# Patient Record
Sex: MALE | Race: BLACK OR AFRICAN AMERICAN | NOT HISPANIC OR LATINO | Employment: UNEMPLOYED | URBAN - METROPOLITAN AREA
[De-identification: names, ages, dates, MRNs, and addresses within clinical notes are randomized per-mention and may not be internally consistent; named-entity substitution may affect disease eponyms.]

---

## 2023-12-28 ENCOUNTER — TELEPHONE (OUTPATIENT)
Dept: PULMONOLOGY | Facility: CLINIC | Age: 58
End: 2023-12-28
Payer: MEDICAID

## 2023-12-28 DIAGNOSIS — R06.02 SOB (SHORTNESS OF BREATH): Primary | ICD-10-CM

## 2023-12-28 NOTE — TELEPHONE ENCOUNTER
----- Message from Aarti Logan sent at 12/28/2023  8:47 AM CST -----  Regarding: STAT REFERRAL  Gene,    Provider Keyur Maddox would like to refer the patient to the Pulmonology department.      The patients diagnosis is: dyspnea    I have scanned the patients referral and records into .     Please review and contact patient to schedule appointment. If there are no appointments available at this time, please advise and I will inform the referring provider/clinic      Thank you,   Aarti Kelley

## 2024-01-12 ENCOUNTER — OFFICE VISIT (OUTPATIENT)
Dept: PULMONOLOGY | Facility: CLINIC | Age: 59
End: 2024-01-12
Payer: MEDICAID

## 2024-01-12 ENCOUNTER — HOSPITAL ENCOUNTER (OUTPATIENT)
Dept: PULMONOLOGY | Facility: CLINIC | Age: 59
Discharge: HOME | End: 2024-01-12
Payer: MEDICAID

## 2024-01-12 VITALS
HEIGHT: 71 IN | DIASTOLIC BLOOD PRESSURE: 91 MMHG | BODY MASS INDEX: 29.73 KG/M2 | SYSTOLIC BLOOD PRESSURE: 144 MMHG | HEART RATE: 70 BPM | OXYGEN SATURATION: 98 %

## 2024-01-12 VITALS — WEIGHT: 213.19 LBS | HEIGHT: 71 IN | BODY MASS INDEX: 29.85 KG/M2

## 2024-01-12 DIAGNOSIS — R06.02 SOB (SHORTNESS OF BREATH): Primary | ICD-10-CM

## 2024-01-12 DIAGNOSIS — R06.02 SOB (SHORTNESS OF BREATH): ICD-10-CM

## 2024-01-12 DIAGNOSIS — R04.2 HEMOPTYSIS: ICD-10-CM

## 2024-01-12 LAB
DLCO SINGLE BREATH LLN: 23.07
DLCO SINGLE BREATH PRE REF: 63.9 %
DLCO SINGLE BREATH REF: 30
DLCOC SBVA LLN: 2.95
DLCOC SBVA REF: 4.09
DLCOC SINGLE BREATH LLN: 23.07
DLCOC SINGLE BREATH REF: 30
DLCOCSBVAULN: 5.24
DLCOCSINGLEBREATHULN: 36.92
DLCOSINGLEBREATHULN: 36.92
DLCOVA LLN: 2.95
DLCOVA PRE REF: 94.5 %
DLCOVA PRE: 3.87 ML/(MIN*MMHG*L) (ref 2.95–5.24)
DLCOVA REF: 4.09
DLCOVAULN: 5.24
ERV LLN: -16448.78
ERV PRE REF: 47.3 %
ERV REF: 1.22
ERVULN: ABNORMAL
FEF 25 75 LLN: 1.12
FEF 25 75 PRE REF: 119 %
FEF 25 75 REF: 2.66
FEV05 LLN: 1.77
FEV05 REF: 2.91
FEV1 FVC LLN: 66
FEV1 FVC PRE REF: 101.4 %
FEV1 FVC REF: 78
FEV1 LLN: 2.29
FEV1 PRE REF: 84.3 %
FEV1 REF: 3.16
FRCPLETH LLN: 2.68
FRCPLETH PREREF: 64 %
FRCPLETH REF: 3.67
FRCPLETHULN: 4.66
FVC LLN: 3.02
FVC PRE REF: 83 %
FVC REF: 4.06
IVC PRE: 3.62 L (ref 3.02–5.11)
IVC SINGLE BREATH LLN: 3.02
IVC SINGLE BREATH PRE REF: 89.2 %
IVC SINGLE BREATH REF: 4.06
IVCSINGLEBREATHULN: 5.11
LLN IC: -9999996.55
PEF LLN: 5.94
PEF PRE REF: 91.9 %
PEF REF: 8.67
PHYSICIAN COMMENT: ABNORMAL
PRE DLCO: 19.17 ML/(MIN*MMHG) (ref 23.07–36.92)
PRE ERV: 0.58 L (ref -16448.78–16451.22)
PRE FEF 25 75: 3.17 L/S (ref 1.12–4.87)
PRE FET 100: 7.26 SEC
PRE FEV05 REF: 78.6 %
PRE FEV1 FVC: 79.04 % (ref 66.33–88.05)
PRE FEV1: 2.66 L (ref 2.29–3.97)
PRE FEV5: 2.28 L (ref 1.77–4.04)
PRE FRC PL: 2.35 L (ref 2.68–4.66)
PRE FVC: 3.37 L (ref 3.02–5.11)
PRE IC: 3.04 L (ref -9999996.55–#######.####)
PRE PEF: 7.96 L/S (ref 5.94–11.4)
PRE REF IC: 88.1 %
PRE RV: 1.77 L (ref 1.78–3.13)
PRE TLC: 5.39 L (ref 6.18–8.48)
RAW PRE REF: 144.2 %
RAW PRE: 4.41 CMH2O*S/L (ref 3.06–3.06)
RAW REF: 3.06
REF IC: 3.45
RV LLN: 1.78
RV PRE REF: 72.3 %
RV REF: 2.45
RVTLC LLN: 28
RVTLC PRE REF: 88 %
RVTLC PRE: 32.89 % (ref 28.38–46.34)
RVTLC REF: 37
RVTLCULN: 46
RVULN: 3.13
SGAW PRE REF: 97 %
SGAW PRE: 0.08 1/(CMH2O*S) (ref 0.08–0.08)
SGAW REF: 0.08
TLC LLN: 6.18
TLC PRE REF: 73.6 %
TLC REF: 7.33
TLC ULN: 8.48
ULN IC: ABNORMAL
VA PRE: 4.96 L (ref 7.18–7.18)
VA SINGLE BREATH LLN: 7.18
VA SINGLE BREATH PRE REF: 69 %
VA SINGLE BREATH REF: 7.18
VASINGLEBREATHULN: 7.18
VC LLN: 3.02
VC PRE REF: 89.2 %
VC PRE: 3.62 L (ref 3.02–5.11)
VC REF: 4.06
VC ULN: 5.11

## 2024-01-12 PROCEDURE — 94729 DIFFUSING CAPACITY: CPT | Mod: 26,S$PBB,, | Performed by: INTERNAL MEDICINE

## 2024-01-12 PROCEDURE — 99213 OFFICE O/P EST LOW 20 MIN: CPT | Mod: PBBFAC | Performed by: STUDENT IN AN ORGANIZED HEALTH CARE EDUCATION/TRAINING PROGRAM

## 2024-01-12 PROCEDURE — 94726 PLETHYSMOGRAPHY LUNG VOLUMES: CPT | Mod: PBBFAC | Performed by: INTERNAL MEDICINE

## 2024-01-12 PROCEDURE — 94010 BREATHING CAPACITY TEST: CPT | Mod: PBBFAC | Performed by: INTERNAL MEDICINE

## 2024-01-12 PROCEDURE — 1159F MED LIST DOCD IN RCRD: CPT | Mod: CPTII,,, | Performed by: INTERNAL MEDICINE

## 2024-01-12 PROCEDURE — 94726 PLETHYSMOGRAPHY LUNG VOLUMES: CPT | Mod: 26,S$PBB,, | Performed by: INTERNAL MEDICINE

## 2024-01-12 PROCEDURE — 94618 PULMONARY STRESS TESTING: CPT | Mod: 26,S$PBB,, | Performed by: INTERNAL MEDICINE

## 2024-01-12 PROCEDURE — 3008F BODY MASS INDEX DOCD: CPT | Mod: CPTII,,, | Performed by: INTERNAL MEDICINE

## 2024-01-12 PROCEDURE — 94729 DIFFUSING CAPACITY: CPT | Mod: PBBFAC | Performed by: INTERNAL MEDICINE

## 2024-01-12 PROCEDURE — 99204 OFFICE O/P NEW MOD 45 MIN: CPT | Mod: S$PBB,,, | Performed by: INTERNAL MEDICINE

## 2024-01-12 PROCEDURE — 94618 PULMONARY STRESS TESTING: CPT | Mod: PBBFAC | Performed by: INTERNAL MEDICINE

## 2024-01-12 PROCEDURE — 99999 PR PBB SHADOW E&M-EST. PATIENT-LVL III: CPT | Mod: PBBFAC,,, | Performed by: STUDENT IN AN ORGANIZED HEALTH CARE EDUCATION/TRAINING PROGRAM

## 2024-01-12 PROCEDURE — 3077F SYST BP >= 140 MM HG: CPT | Mod: CPTII,,, | Performed by: INTERNAL MEDICINE

## 2024-01-12 PROCEDURE — 94010 BREATHING CAPACITY TEST: CPT | Mod: 26,S$PBB,59, | Performed by: INTERNAL MEDICINE

## 2024-01-12 PROCEDURE — 3080F DIAST BP >= 90 MM HG: CPT | Mod: CPTII,,, | Performed by: INTERNAL MEDICINE

## 2024-01-12 RX ORDER — POLYMYXIN B SULFATE AND TRIMETHOPRIM 1; 10000 MG/ML; [USP'U]/ML
1 SOLUTION OPHTHALMIC EVERY 4 HOURS
COMMUNITY
Start: 2023-08-04

## 2024-01-12 RX ORDER — IBUPROFEN 800 MG/1
800 TABLET ORAL EVERY 8 HOURS PRN
COMMUNITY
Start: 2023-10-27

## 2024-01-12 RX ORDER — PANTOPRAZOLE SODIUM 40 MG/1
1 TABLET, DELAYED RELEASE ORAL 2 TIMES DAILY
COMMUNITY
Start: 2023-09-15 | End: 2024-09-14

## 2024-01-12 RX ORDER — CLINDAMYCIN HYDROCHLORIDE 150 MG/1
150 CAPSULE ORAL EVERY 6 HOURS
COMMUNITY
Start: 2023-10-27

## 2024-01-12 RX ORDER — ALBUTEROL SULFATE 90 UG/1
2 AEROSOL, METERED RESPIRATORY (INHALATION) EVERY 4 HOURS PRN
COMMUNITY
Start: 2023-08-29 | End: 2024-01-12 | Stop reason: SDUPTHER

## 2024-01-12 RX ORDER — TADALAFIL 20 MG/1
20 TABLET, FILM COATED ORAL
COMMUNITY
Start: 2023-08-16

## 2024-01-12 RX ORDER — PROMETHAZINE HYDROCHLORIDE AND DEXTROMETHORPHAN HYDROBROMIDE 6.25; 15 MG/5ML; MG/5ML
5 SYRUP ORAL 4 TIMES DAILY PRN
COMMUNITY
Start: 2023-08-04

## 2024-01-12 RX ORDER — BUDESONIDE 0.5 MG/2ML
INHALANT ORAL
COMMUNITY
Start: 2023-08-31 | End: 2024-01-12 | Stop reason: SDUPTHER

## 2024-01-12 RX ORDER — CEFDINIR 300 MG/1
300 CAPSULE ORAL
COMMUNITY

## 2024-01-12 RX ORDER — OMEPRAZOLE 40 MG/1
CAPSULE, DELAYED RELEASE ORAL
COMMUNITY
Start: 2023-08-31

## 2024-01-12 RX ORDER — ALBUTEROL SULFATE 90 UG/1
2 AEROSOL, METERED RESPIRATORY (INHALATION) EVERY 4 HOURS PRN
Qty: 18 G | Refills: 12 | Status: SHIPPED | OUTPATIENT
Start: 2024-01-12 | End: 2025-05-19

## 2024-01-12 RX ORDER — BUDESONIDE AND FORMOTEROL FUMARATE DIHYDRATE 80; 4.5 UG/1; UG/1
2 AEROSOL RESPIRATORY (INHALATION) 2 TIMES DAILY
COMMUNITY
Start: 2024-01-04 | End: 2024-01-12 | Stop reason: SDUPTHER

## 2024-01-12 RX ORDER — BUDESONIDE AND FORMOTEROL FUMARATE DIHYDRATE 80; 4.5 UG/1; UG/1
2 AEROSOL RESPIRATORY (INHALATION) 2 TIMES DAILY
Qty: 6.9 G | Refills: 12 | Status: SHIPPED | OUTPATIENT
Start: 2024-01-12 | End: 2025-01-11

## 2024-01-12 RX ORDER — PREDNISONE 10 MG/1
10 TABLET ORAL 2 TIMES DAILY
COMMUNITY
Start: 2023-08-08

## 2024-01-12 RX ORDER — SODIUM, POTASSIUM,MAG SULFATES 17.5-3.13G
SOLUTION, RECONSTITUTED, ORAL ORAL
COMMUNITY
Start: 2023-11-15

## 2024-01-12 NOTE — PROGRESS NOTES
[facility]    SUBJECTIVE:     Chief Complaint: Cough    History of Present Illness:  Chago Allan is a 58 y.o. male who presents for initial evaluation of cough. The patient has not been see n in this clinic but has seen an outside pulmonologist about 2 weeks prior to presentation. The patient reports that he had no previous pulmonary problems until 8/1/2023 when he was exposed to a significant amount of self-leveling concrete powder. He reported he was very ill for about a week following and presented to the ED where he was treated. 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 He feels that since then he has been having worsening RUBIN and has consistent wheezing and cough. Cough is sometimes productive of sputum that has a faint pink tinge to it. He also reports that he has had intermittent dark red bowel movements. He's followed up with GI and undergone an endoscopy but was told there were no abnormalities.    He was evaluated by pulmonology at [facility], PFTs were notable for a mild restriction and decrease in DLCO. He was started on symbicort and albuterol for possible occupational asthma. Since starting his inhalers his cough and wheezing have significantly improved, however he still reports signifciant RUBIN. He was previously able to walk several miles without having stop to catch his breath, however now can not walk 1 mile which is different for him. The patient does have reflux, but it has improved with omeprozole. No seasonal allergies.     Smoking: Smoked 20 years, 1/4 pack per day. Quit two years ago.  Other substances: None  Inhalers: None  Travel: None  Incarceration/homelessness: 18 months in snf 7-8 years ago.  Occupational/environmental exposures: Worked with sheet rock, always wore masks  Pets/hobbies: None  Recent illness: None  B-Symptoms: Weight loss, night sweats.   Autoimmune symptoms/features:  "None  Family Hx: Denies      Review of patient's allergies indicates:  Not on File    No past medical history on file.  No past surgical history on file.  No family history on file.  Social History     Socioeconomic History    Marital status: Unknown       Review of Systems:  ROS  CV: no syncope  ENT: no sore throat  Resp: per hpi  Eyes: no eye pain  Gastrointestinal: no nausea or vomiting  Integument/Breast: no rash  Musculoskeletal: no arthralgias  Neurological: no headaches  Behavioral/Psych: no confusion or depression  Heme: no bleeding      OBJECTIVE:     Vital Signs  Vitals:    01/12/24 1311   BP: (!) 144/91   BP Location: Right arm   Patient Position: Sitting   BP Method: Medium (Manual)   Pulse: 70   SpO2: 98%   Height: 5' 11" (1.803 m)     Body mass index is 29.73 kg/m².    Physical Exam:  Physical Exam  General: no distress  Eyes:  conjunctivae/corneas clear  Nose: no discharge  Neck: trachea midline with no masses appreciated  Lungs:  normal respiratory effort, no wheezes, no rales  Heart: regular rate and rhythm and no murmur  Abdomen: non-distended  Extremities: no cyanosis, no edema, no clubbing  Skin: No rashes or lesions. good skin turgor  Neurologic: alert, oriented, thought content appropriate    Laboratory:  CBC  No results found for: "WBC", "HGB", "HCT", "PLT", "MCV", "RDW"  BMP  No results found for: "NA", "K", "CL", "CO2", "BUN", "CREATININE", "GLU", "CALCIUM", "MG", "PHOS"  LFTs  No results found for: "PROT", "ALBUMIN", "BILITOT", "AST", "ALKPHOS", "ALT", "GGT"    Outside PFTs. 1/4/2023   Spirometry is within normal limits. No obstruction.   Lung volumes reveal moderate restriction (TLC 60-69% predicted).   Diffusion capacity is mildly reduced (60-79% predicted).     PFT  1/12/24                  FEV1/FVC           79  FEV1                    2.66 (84%)  FVC                      3.37 (83%)  RV                        1.77 (72.3%)  TLC                      73.6%  DLCO                   " "63.9%  DLCO/Va            3.87 (94.5%)     My interpretation (most recent):   No obstruction on spirometry. Mild restriction on lung volumes, without decrease in FVC. Mild decrease in DLCO, uncorrected for hemoglobin.    Chest Imaging, My Impression:   CT Chest 3/2018 (outside hospital report)  "Few small cysts are seen within the right lower lobe, in the paravertebral region and more posteriorly. The more posterior area, may be related to focal bronchiectasis, given the suggestion of branching. There are areas of dependent atelectasis, however, no significantly concerning nodules, parenchymal areas of lung consolidation or pleural effusions. No definite lymphadenopathy is identified on these noncontrast images."        ASSESSMENT/PLAN:     No problems updated.  Problem List Items Addressed This Visit          Pulmonary    SOB (shortness of breath) - Primary    Current Assessment & Plan     Patient coming in for initial evaluation of shortness of breath and wheezing. The patient reports an exposure inhaling concrete dust in 8/2023 which precipitated all symptoms. It seems unlikely that single exposure would lead to lasting symptoms given he has avoided all exposures since. It's possible he has had underlying pulmonary problems that have only just come to a head. PFTs with some mild restriction and decrease in DLCO. This does raise the question of ILD, only CT is an outside read with reported cysts vs. Bronchiectasis in 2018.    He was started on symbicort due to possible occupational asthma, patient did have significant wheezing and cough, and this has improved with bronchodilator / ICS therapy.    - Will check high-res CT chest to evaluate lung parenchyma, also will provide screening due to prior smoking history  - Continue symbicort as it is providing symptomatic relief  - Further workup pending CT  - Follow up in clinic in 1 month.         Relevant Medications    budesonide-formoterol 80-4.5 mcg (SYMBICORT) " 80-4.5 mcg/actuation HFAA    albuterol (PROVENTIL/VENTOLIN HFA) 90 mcg/actuation inhaler    Other Relevant Orders    CT Chest High Resolution Without Contrast     Other Visit Diagnoses       Hemoptysis        Relevant Medications    budesonide-formoterol 80-4.5 mcg (SYMBICORT) 80-4.5 mcg/actuation HFAA    albuterol (PROVENTIL/VENTOLIN HFA) 90 mcg/actuation inhaler    Other Relevant Orders    CT Chest High Resolution Without Contrast            Calos Reyes M.D.  [facility] Pulmonary & Critical Care Fellow

## 2024-01-12 NOTE — PROCEDURES
Chago Allan is a 58 y.o.  male patient, who presents for a 6 minute walk test ordered by MD Aniyah.  The diagnosis is Shortness of Breath.  The patient's BMI is 29.7 kg/m2.  Predicted distance (lower limit of normal) is 403.98 meters.      Test Results:    The test was completed with stops. The patient stopped 2 times for a total of 20 seconds. The total time walked was 340 seconds. During walking, the patient reported:  Dyspnea, Leg pain, Other (Comment) (back pain). The patient used no assistive devices during testing.     01/12/2024---------Distance: 243.84 meters (800 feet)     O2 Sat % Supplemental Oxygen Heart Rate Blood Pressure Je Scale   Pre-exercise  (Resting) 98 % Room Air 65 bpm 172/92 mmHg 5-6   During Exercise 97 % Room Air 76 bpm 144/91 mmHg 5-6   Post-exercise  (Recovery) 98 % Room Air  70 bpm       Recovery Time: 74 seconds    Performing nurse/tech: Estopinal RRT      PREVIOUS STUDY:   The patient has not had a previous study.      CLINICAL INTERPRETATION:  Six minute walk distance is 243.84 meters (800 feet) with heavy dyspnea.  During exercise, there was no significant desaturation while breathing room air.  Blood pressure decreased significantly and Heart rate remained stable with walking.  Hypertension was present prior to exercise.  The patient reported non-pulmonary symptoms during exercise.  Significant exercise impairment is likely due to cardiovascular causes and subjective symptoms.  The patient did complete the study, walking 340 seconds of the 360 second test.  No previous study performed.  Based upon age and body mass index, exercise capacity is less than predicted.

## 2024-01-13 NOTE — ASSESSMENT & PLAN NOTE
Patient coming in for initial evaluation of shortness of breath and wheezing. The patient reports an exposure inhaling concrete dust in 8/2023 which precipitated all symptoms. It seems unlikely that single exposure would lead to lasting symptoms given he has avoided all exposures since. It's possible he has had underlying pulmonary problems that have only just come to a head. PFTs with some mild restriction and decrease in DLCO. This does raise the question of ILD, only CT is an outside read with reported cysts vs. Bronchiectasis in 2018.    He was started on symbicort due to possible occupational asthma, patient did have significant wheezing and cough, and this has improved with bronchodilator / ICS therapy.    - Will check high-res CT chest to evaluate lung parenchyma, also will provide screening due to prior smoking history  - Continue symbicort as it is providing symptomatic relief  - Further workup pending CT  - Follow up in clinic in 1 month.

## 2024-01-22 ENCOUNTER — TELEPHONE (OUTPATIENT)
Dept: PULMONOLOGY | Facility: CLINIC | Age: 59
End: 2024-01-22
Payer: MEDICAID

## 2024-01-22 NOTE — TELEPHONE ENCOUNTER
----- Message from Ofelia Rhodes sent at 1/22/2024 10:11 AM CST -----  Regarding: Ct Scan  Contact: Pt  [phone]  Pt is calling to state he will have CT Scan at [facility] because the insurance has approved it over there please call

## 2024-01-22 NOTE — TELEPHONE ENCOUNTER
Left message on patient voicemail, informing him that I have received his message. I also advised pt that if he has any questions or concerns, he may contact the office. Office number has been provided.

## 2024-01-26 NOTE — PROGRESS NOTES
I have reviewed the notes, assessments, and/or procedures performed this visit, and I concur with the documentation.  F/u HRCT to evaluate for interstitial pulmonary lung disease. Encouraged continued exercise and physical activity.

## 2024-02-19 ENCOUNTER — TELEPHONE (OUTPATIENT)
Dept: UROLOGY | Facility: CLINIC | Age: 59
End: 2024-02-19
Payer: MEDICAID

## 2024-02-19 NOTE — TELEPHONE ENCOUNTER
Referring provider contacted, they will fax over psa results.  ----- Message from Marilou Fraga MD sent at 2/18/2024  7:13 AM CST -----  Regarding: psa  no psa lab in referral sent, please fax request for PSA from referring provider asap

## 2024-02-20 ENCOUNTER — TELEPHONE (OUTPATIENT)
Dept: PULMONOLOGY | Facility: CLINIC | Age: 59
End: 2024-02-20
Payer: MEDICAID

## 2024-02-20 NOTE — TELEPHONE ENCOUNTER
Left message on pt voicemail, informing him that I'm contacting him In regards to scheduling his Ct Scan prior to his appointment with Dr Reyes. I also advised pt that if he wants to schedule Ct Scan or if he has any questions or concerns, he may contact the office. Office number has been provided.

## 2024-09-27 ENCOUNTER — OFFICE VISIT (OUTPATIENT)
Dept: CARDIOLOGY | Facility: CLINIC | Age: 59
End: 2024-09-27
Payer: MEDICAID

## 2024-09-27 VITALS
RESPIRATION RATE: 18 BRPM | HEART RATE: 72 BPM | DIASTOLIC BLOOD PRESSURE: 86 MMHG | BODY MASS INDEX: 29.02 KG/M2 | SYSTOLIC BLOOD PRESSURE: 132 MMHG | HEIGHT: 71 IN | OXYGEN SATURATION: 97 % | WEIGHT: 207.31 LBS

## 2024-09-27 DIAGNOSIS — R07.9 CHEST PAIN, UNSPECIFIED TYPE: ICD-10-CM

## 2024-09-27 DIAGNOSIS — R00.2 PALPITATIONS: ICD-10-CM

## 2024-09-27 DIAGNOSIS — R06.02 SOB (SHORTNESS OF BREATH): Primary | ICD-10-CM

## 2024-09-27 PROCEDURE — 99999 PR PBB SHADOW E&M-EST. PATIENT-LVL IV: CPT | Mod: PBBFAC,,, | Performed by: INTERNAL MEDICINE

## 2024-09-27 PROCEDURE — 99214 OFFICE O/P EST MOD 30 MIN: CPT | Mod: PBBFAC | Performed by: INTERNAL MEDICINE

## 2024-09-27 NOTE — PROGRESS NOTES
"       CARDIOVASCULAR CONSULTATION    REASON FOR CONSULT:   Chago Allan is a 58 y.o. male who presents for   Chief Complaint   Patient presents with    Consult        Referred by:  Self, Aaareferral  No address on file      HISTORY OF PRESENT ILLNESS:     SUBJECTIVE  CHIEF COMPLAINT:  Chago presents today for follow-up after exposure to chemical toxin.    CHEMICAL EXPOSURE:  He reports exposure to levelquik RS chemical powder toxin, breathing it for approximately 40 minutes while in a room, unaware of its presence at the time.    CARDIOVASCULAR SYMPTOMS:  He experiences chest pain, described as feeling like being hit with a hammer, primarily at nighttime. He believes he may have had a minor heart attack in the past  when he was exposed to the chemical. He reports numbness in his left arm when lying down and heart rate variations, noting that his heart sometimes beats slowly and other times rapidly.    RESPIRATORY SYMPTOMS:  He reports breathing problems and shortness of breath, especially with exertion such as walking on a treadmill. His breathing difficulties worsen at night, causing him to sit up for extended periods before being able to lie down again. He denies childhood asthma but reports developing work-related asthma following exposure to an unspecified substance.    GASTROINTESTINAL AND ORAL SYMPTOMS:  He reports difficulty swallowing and was unable to eat or drink for 13 days, describing his esophagus as feeling like "broke up concrete." He denies current ability to swallow normally. He also reports his tongue was blue and black.    OCULAR SYMPTOMS:  He reports experiencing pulses in his eyes and describes blood coming out of his eye.    SLEEP DISTURBANCES:  He has significant sleep disturbances, needing to sit outside for two hours before being able to lie down to sleep. When he does lie down, he must sleep on his abdomen. His left arm goes numb when lying down.    ORGAN-RELATED SYMPTOMS:  He experiences " "organ-related symptoms, particularly at night, describing his organs feeling "activated" and sensation of them "jumping, moving around." These symptoms contribute to difficulty sleeping.    LOWER EXTREMITY SYMPTOMS:  He reports leg swelling, particularly when attempting to run on a treadmill. He experiences numbness in one leg, while the other leg does not become numb. He also describes tightness in the legs when walking, with the severity dependent on the distance walked.    PREVIOUS MEDICAL EVALUATIONS:  He reports previous medical evaluations including cardiology at [facility] where he underwent a stress test and echocardiogram. He also mentions being seen by Dr. Campbell, a pulmonologist. He expresses concern about the accuracy of previous cardiac evaluations and desires a second opinion.    SOCIAL HISTORY:  He has a history of smoking for approximately 27 years, consuming about 6 cigarettes per day. He quit smoking 3 years ago.    FAMILY HISTORY:  He denies any family history of heart disease, heart attacks, or strokes.     Outside testing:    ECHO      CONCLUSIONS     NSR 68 bpm     Normal right heart size     Minimal TR / PAsys ~ 22 mmHg     LA normal size / LAD 41 mm     Normal MV / minimal MR     Normal LV / EF 61%     Indeterminate diastolic function      Normal three leaflet aortic valve     No AS and no AR     No pericardial effusion             Antonio Bautista    (Electronically Signed)     Final Date: 26 July 2024 16:51       STRESS TEST    GATED -    Technically suboptimal probably normal EF 53%. No defiinite wall motion abnormalities.     Normal stress ECG Interpretation   Ischemia - Negative   Arrhythmias - Negative     No prior study for comparison.          Sheldon Adams       (Electronically Signed)     Final Date:15 August 2024 19:53       SPECT -   Technically suboptimal scans with apparent diaphragmatic attenuation.   Moderate sized mild intensity fixed defect of inferior wall.   No definite reversible " ischemia noted.     GATED -    Technically suboptimal probably normal EF 53%. No defiinite wall motion abnormalities.     Normal stress ECG Interpretation   Ischemia - Negative   Arrhythmias - Negative     No prior study for comparison.          Sheldon Adams       (Electronically Signed)     Final Date:15 August 2024 19:53       CONCLUSIONS  Nondiagnostic stress test due to failure to achieve target heart rate.  Exercise capacity fair to good at 6-10 METS.    Abnormal rise in blood pressure during or after stress.    No arrhythmias.  Reccomend nuclear stress if clinically indicated.         Eli Ashley   (Electronically Signed)   Final Date: 26 July 2024 16:01      Distance:    Predicted:  1921 ft   Actual:  1000 ft   52% predicted     SpO2:    Rest:  98% on ambient air   Exercise:  96% on ambient air     Heart rate did increase appropriately with exercise.       Based on this study supplemental oxygen is not indicated.       Karlo Campbell MD   Pulmonary Critical Care Medicine   [facility]         CATH    No results found for this or any previous visit.      PAST MEDICAL HISTORY:   No past medical history on file.    PAST SURGICAL HISTORY:   No past surgical history on file.        SOCIAL HISTORY:     Social History     Socioeconomic History    Marital status: Unknown   Tobacco Use    Smoking status: Former     Types: Cigarettes    Smokeless tobacco: Never       FAMILY HISTORY:   No family history on file.    REVIEW OF SYSTEMS:   Review of Systems   Constitutional: Negative.   HENT: Negative.     Eyes: Negative.    Cardiovascular:  Positive for chest pain and dyspnea on exertion.   Respiratory:  Positive for shortness of breath.    Endocrine: Negative.    Hematologic/Lymphatic: Negative.    Skin: Negative.    Musculoskeletal: Negative.    Gastrointestinal:  Positive for dysphagia.   Genitourinary: Negative.    Neurological: Negative.    Psychiatric/Behavioral: Negative.    "  Allergic/Immunologic: Negative.        A 10 point review of systems was performed and all the pertinent positives have been mentioned. Rest of review of systems was negative.        PHYSICAL EXAM:     Vitals:    09/27/24 0942   BP: 132/86   Pulse: 72   Resp: 18    Body mass index is 28.92 kg/m².  Weight: 94 kg (207 lb 5.5 oz)   Height: 5' 11" (180.3 cm)     Physical Exam  Vitals reviewed.   Constitutional:       Appearance: He is well-developed.   HENT:      Head: Normocephalic.   Eyes:      Conjunctiva/sclera: Conjunctivae normal.      Pupils: Pupils are equal, round, and reactive to light.   Cardiovascular:      Rate and Rhythm: Normal rate and regular rhythm.      Heart sounds: Normal heart sounds.   Pulmonary:      Effort: Pulmonary effort is normal.      Breath sounds: Normal breath sounds.   Abdominal:      General: Bowel sounds are normal.      Palpations: Abdomen is soft.   Musculoskeletal:      Cervical back: Normal range of motion and neck supple.   Skin:     General: Skin is warm.   Neurological:      Mental Status: He is alert and oriented to person, place, and time.           DATA:     Laboratory:  CBC:        CHEMISTRIES:        CARDIAC BIOMARKERS:        COAGS:  Recent Labs   Lab 09/15/23  2225   INR 1.0       LIPIDS/LFTS:        No results found for: "HGBA1C"    TSH        The ASCVD Risk score (Alex DK, et al., 2019) failed to calculate for the following reasons:    Cannot find a previous HDL lab    Cannot find a previous total cholesterol lab       BNP    No results found for: "BNP"      ASSESSMENT AND PLAN     Patient Active Problem List   Diagnosis    SOB (shortness of breath)         ALLERGIES AND MEDICATION:   Review of patient's allergies indicates:  No Known Allergies     Medication List            Accurate as of September 27, 2024  5:13 PM. If you have any questions, ask your nurse or doctor.                CONTINUE taking these medications      albuterol 90 mcg/actuation " inhaler  Commonly known as: PROVENTIL/VENTOLIN HFA  Inhale 2 puffs into the lungs every 4 (four) hours as needed for Wheezing or Shortness of Breath.     budesonide-formoterol 80-4.5 mcg 80-4.5 mcg/actuation Hfaa  Commonly known as: SYMBICORT  Inhale 2 puffs into the lungs 2 (two) times daily.     cefdinir 300 MG capsule  Commonly known as: OMNICEF     CIALIS 20 mg Tab  Generic drug: tadalafiL     clindamycin 150 MG capsule  Commonly known as: CLEOCIN     ibuprofen 800 MG tablet  Commonly known as: ADVIL,MOTRIN     omeprazole 40 MG capsule  Commonly known as: PRILOSEC     pantoprazole 40 MG tablet  Commonly known as: PROTONIX     polymyxin B sulf-trimethoprim 10,000 unit- 1 mg/mL Drop  Commonly known as: POLYTRIM     predniSONE 10 MG tablet  Commonly known as: DELTASONE     promethazine-dextromethorphan 6.25-15 mg/5 mL Syrp  Commonly known as: PROMETHAZINE-DM     sodium,potassium,mag sulfates 17.5-3.13-1.6 gram Solr  Commonly known as: SUPREP BOWEL PREP KIT              Orders Placed This Encounter   Procedures    Cardiac PET Scan Stress    Cardiac event monitor    IN OFFICE EKG 12-LEAD (to [place])    Echo         SSESSMENT   Reviewed patient's history of exposure to chemical toxin and subsequent symptoms including chest pain   Noted previous cardiology workup at [facility]    Considered patient's request for second opinion and further cardiac evaluation   Assessed patient's inability to perform treadmill test due to shortness of breath and leg swelling   Evaluated reported heart rhythm irregularities   Determined need for more comprehensive cardiac testing to rule out heart attack or muscle damage      PLAN  CARDIOVASCULAR HEALTH:   Chago to continue not smoking (reported not smoking for 3 years).   PET stress test ordered.   Echocardiogram ordered    Event monitor ordered for 1 month to capture heart rhythm irregularities.    FOLLOW UP:   Follow up after completion of ordered tests to review results and determine next  steps.    Portions of this note were generated by [employer]. This note was generated with the assistance of ambient listening technology. Verbal consent was obtained by the patient and accompanying visitor(s) for the recording of patient appointment to facilitate this note. I attest to having reviewed and edited the generated note for accuracy, though some syntax or spelling errors may persist. Please contact the author of this note for any clarification.    Thank you very much for involving me in the care of your patient.  Please do not hesitate to contact me if there are any questions.      Marcelo Chua MD, FACC, [facility]  Interventional Cardiologist, [facility].           This note was dictated with the help of speech recognition software.  There might be un-intended errors and/or substitutions.

## 2024-10-07 ENCOUNTER — TELEPHONE (OUTPATIENT)
Dept: CARDIOLOGY | Facility: CLINIC | Age: 59
End: 2024-10-07
Payer: MEDICAID

## 2024-10-07 NOTE — TELEPHONE ENCOUNTER
----- Message from Marcelo Chua MD sent at 10/4/2024  4:37 PM CDT -----  Regarding: RE: Appt Scheduled  Contact: Patient  No need  ----- Message -----  From: Brielle Graham MA  Sent: 10/4/2024   2:51 PM CDT  To: Marcelo Chua MD  Subject: FW: Appt Scheduled                               Please advise, patient wants to make sure he really needs to do the echo since he states he just had one done last month.  ----- Message -----  From: Barbra Butcher  Sent: 10/4/2024  11:05 AM CDT  To: [facility] Robertson Staff  Subject: Appt Scheduled                                   Type:  Needs Medical Advice    Who Called: Patient   Symptoms (please be specific):  n/a    How long has patient had these symptoms:  n/a   Pharmacy name and phone #:  n/a   Would the patient rather a call back or a response via [facility]? Call back   Best Call Back Number:  [phone]  Additional Information: Patient is scheduled to complete an Echo on 10/07/2024 Patient stated he had an echo done a month ago at [place] and would like to know why physician wants him to do another please assist

## 2024-10-16 ENCOUNTER — TELEPHONE (OUTPATIENT)
Dept: CARDIOLOGY | Facility: CLINIC | Age: 59
End: 2024-10-16
Payer: MEDICAID
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
## 2024-10-31 ENCOUNTER — DOCUMENTATION ONLY (OUTPATIENT)
Dept: CARDIOLOGY | Facility: HOSPITAL | Age: 59
End: 2024-10-31
Payer: MEDICAID

## 2024-11-01 ENCOUNTER — CLINICAL SUPPORT (OUTPATIENT)
Dept: CARDIOLOGY | Facility: HOSPITAL | Age: 59
End: 2024-11-01
Attending: INTERNAL MEDICINE
Payer: MEDICAID

## 2024-11-01 DIAGNOSIS — R07.9 CHEST PAIN, UNSPECIFIED TYPE: ICD-10-CM

## 2024-11-01 DIAGNOSIS — R06.02 SOB (SHORTNESS OF BREATH): ICD-10-CM

## 2024-11-01 DIAGNOSIS — R00.2 PALPITATIONS: ICD-10-CM

## 2025-01-16 ENCOUNTER — HOSPITAL ENCOUNTER (OUTPATIENT)
Dept: CARDIOLOGY | Facility: HOSPITAL | Age: 60
Discharge: HOME | End: 2025-01-16
Attending: INTERNAL MEDICINE
Payer: MEDICAID

## 2025-01-16 VITALS
DIASTOLIC BLOOD PRESSURE: 78 MMHG | WEIGHT: 207.25 LBS | BODY MASS INDEX: 29.02 KG/M2 | HEIGHT: 71 IN | HEART RATE: 81 BPM | SYSTOLIC BLOOD PRESSURE: 132 MMHG

## 2025-01-16 DIAGNOSIS — R06.02 SOB (SHORTNESS OF BREATH): ICD-10-CM

## 2025-01-16 DIAGNOSIS — R07.9 CHEST PAIN, UNSPECIFIED TYPE: ICD-10-CM

## 2025-01-16 LAB
ASCENDING AORTA: 3.02 CM
AV AREA BY CONTINUOUS VTI: 2.7 CM2
AV INDEX (PROSTH): 0.84
AV LVOT MEAN GRADIENT: 3 MMHG
AV LVOT PEAK GRADIENT: 5 MMHG
AV MEAN GRADIENT: 4 MMHG
AV PEAK GRADIENT: 7 MMHG
AV REGURGITATION PRESSURE HALF TIME: 289 MS
AV VALVE AREA BY VELOCITY RATIO: 2.7 CM²
AV VALVE AREA: 2.6 CM2
AV VELOCITY RATIO: 0.85
BSA FOR ECHO PROCEDURE: 2.17 M2
CV ECHO LV RWT: 0.26 CM
DOP CALC AO PEAK VEL: 1.3 M/S
DOP CALC AO VTI: 25.9 CM
DOP CALC LVOT AREA: 3.1 CM2
DOP CALC LVOT DIAMETER: 2 CM
DOP CALC LVOT PEAK VEL: 1.1 M/S
DOP CALC LVOT STROKE VOLUME: 68.5 CM3
DOP CALCLVOT PEAK VEL VTI: 21.8 CM
E WAVE DECELERATION TIME: 172 MS
E/A RATIO: 1
ECHO EF ESTIMATED: 70 %
ECHO LV POSTERIOR WALL: 0.6 CM (ref 0.6–1.1)
EJECTION FRACTION: 63 %
FRACTIONAL SHORTENING: 39.1 % (ref 28–44)
INTERVENTRICULAR SEPTUM: 0.8 CM (ref 0.6–1.1)
IVC DIAMETER: 1.27 CM
LA MAJOR: 5.3 CM
LA MINOR: 5.2 CM
LA WIDTH: 3.2 CM
LEFT ATRIUM SIZE: 3.6 CM
LEFT ATRIUM VOLUME INDEX MOD: 19 ML/M2
LEFT ATRIUM VOLUME INDEX: 24 ML/M2
LEFT ATRIUM VOLUME MOD: 41 ML
LEFT ATRIUM VOLUME: 51 CM3
LEFT INTERNAL DIMENSION IN SYSTOLE: 2.8 CM (ref 2.1–4)
LEFT VENTRICLE DIASTOLIC VOLUME INDEX: 46.46 ML/M2
LEFT VENTRICLE DIASTOLIC VOLUME: 99.43 ML
LEFT VENTRICLE MASS INDEX: 46.4 G/M2
LEFT VENTRICLE SYSTOLIC VOLUME INDEX: 14.1 ML/M2
LEFT VENTRICLE SYSTOLIC VOLUME: 30.17 ML
LEFT VENTRICULAR INTERNAL DIMENSION IN DIASTOLE: 4.6 CM (ref 3.5–6)
LEFT VENTRICULAR MASS: 99.3 G
LV LATERAL E/E' RATIO: 5.5
MV PEAK A VEL: 0.66 M/S
MV PEAK E VEL: 0.66 M/S
OHS CV RV/LV RATIO: 0.89 CM
PISA TR MAX VEL: 1.6 M/S
RA MAJOR: 4.49 CM
RA PRESSURE ESTIMATED: 3 MMHG
RA WIDTH: 3.94 CM
RIGHT ATRIAL AREA: 14.3 CM2
RIGHT VENTRICLE DIASTOLIC BASEL DIMENSION: 4.1 CM
RV TB RVSP: 5 MMHG
RV TISSUE DOPPLER FREE WALL SYSTOLIC VELOCITY 1 (APICAL 4 CHAMBER VIEW): 13.9 CM/S
SINUS: 3.28 CM
STJ: 3.01 CM
TDI LATERAL: 0.12 M/S
TR MAX PG: 10 MMHG
TRICUSPID ANNULAR PLANE SYSTOLIC EXCURSION: 1.81 CM
TV PEAK GRADIENT: 11 MMHG
TV REST PULMONARY ARTERY PRESSURE: 13 MMHG
Z-SCORE OF LEFT VENTRICULAR DIMENSION IN END DIASTOLE: -4.03
Z-SCORE OF LEFT VENTRICULAR DIMENSION IN END SYSTOLE: -3.2

## 2025-01-16 PROCEDURE — 93306 TTE W/DOPPLER COMPLETE: CPT

## 2025-01-16 PROCEDURE — 93306 TTE W/DOPPLER COMPLETE: CPT | Mod: 26,,, | Performed by: INTERNAL MEDICINE

## 2025-01-17 ENCOUNTER — TELEPHONE (OUTPATIENT)
Dept: CARDIOLOGY | Facility: CLINIC | Age: 60
End: 2025-01-17
Payer: MEDICAID

## 2025-03-24 ENCOUNTER — TELEPHONE (OUTPATIENT)
Dept: CARDIOLOGY | Facility: CLINIC | Age: 60
End: 2025-03-24
Payer: MEDICAID

## 2025-05-14 ENCOUNTER — TELEPHONE (OUTPATIENT)
Dept: CARDIOLOGY | Facility: CLINIC | Age: 60
End: 2025-05-14
Payer: MEDICAID

## 2025-06-30 ENCOUNTER — HOSPITAL ENCOUNTER (OUTPATIENT)
Dept: CARDIOLOGY | Facility: HOSPITAL | Age: 60
Discharge: HOME | End: 2025-06-30
Attending: INTERNAL MEDICINE
Payer: MEDICAID

## 2025-06-30 DIAGNOSIS — R07.9 CHEST PAIN, UNSPECIFIED TYPE: ICD-10-CM

## 2025-06-30 DIAGNOSIS — R06.02 SOB (SHORTNESS OF BREATH): ICD-10-CM

## 2025-06-30 NOTE — NURSING
Pt scheduled for PET scan today. On assessment, pt having active wheezing when breathing. Has already utilized inhaler 3 times today. Will not be doing test today for concern of bronchospasms with regadenoson.

## 2025-07-02 ENCOUNTER — TELEPHONE (OUTPATIENT)
Dept: CARDIOLOGY | Facility: CLINIC | Age: 60
End: 2025-07-02
Payer: MEDICAID

## 2025-07-02 NOTE — TELEPHONE ENCOUNTER
----- Message from Marcelo Chua MD sent at 7/2/2025 12:41 PM CDT -----  Regarding: RE: Wheezing, PET scan  Please ask him to contact his pcp for wheezing rx. We need the PET scan. Reschedule when he is feeling better.  ----- Message -----  From: Brielle Graham MA  Sent: 6/30/2025   1:53 PM CDT  To: Marcelo Chua MD  Subject: FW: Wheezing, PET scan                           Please advise  ----- Message -----  From: Neil Zendejas RN  Sent: 6/30/2025  12:01 PM CDT  To: [facility] Staff  Subject: Wheezing, PET scan                               Pt scheduled for PET scan today. On assessment, pt having active wheezing when breathing. Has already utilized inhaler 3 times today. Will not be doing test today for concern of bronchospasms with regadenoson. Pt stated he is having this more frequently lately so we did not reschedule him for another date. Please reach out to patient to recommend another scan or have reschedule when he is feeling better.

## 2025-07-07 ENCOUNTER — HOSPITAL ENCOUNTER (OUTPATIENT)
Dept: CARDIOLOGY | Facility: HOSPITAL | Age: 60
Discharge: HOME | End: 2025-07-07
Attending: INTERNAL MEDICINE
Payer: MEDICAID

## 2025-07-07 VITALS — HEART RATE: 81 BPM | SYSTOLIC BLOOD PRESSURE: 102 MMHG | DIASTOLIC BLOOD PRESSURE: 51 MMHG

## 2025-07-07 PROCEDURE — 63600175 PHARM REV CODE 636 W HCPCS: Performed by: INTERNAL MEDICINE

## 2025-07-07 PROCEDURE — A9555 RB82 RUBIDIUM: HCPCS | Performed by: INTERNAL MEDICINE

## 2025-07-07 PROCEDURE — 78431 MYOCRD IMG PET RST&STRS CT: CPT

## 2025-07-07 RX ORDER — REGADENOSON 0.08 MG/ML
0.4 INJECTION, SOLUTION INTRAVENOUS
Status: COMPLETED | OUTPATIENT
Start: 2025-07-07 | End: 2025-07-07

## 2025-07-07 RX ADMIN — RUBIDIUM CHLORIDE RB-82 28.9 MILLICURIE: 150 INJECTION, SOLUTION INTRAVENOUS at 09:07

## 2025-07-07 RX ADMIN — REGADENOSON 0.4 MG: 0.08 INJECTION, SOLUTION INTRAVENOUS at 09:07

## 2025-07-07 RX ADMIN — RUBIDIUM CHLORIDE RB-82 28.7 MILLICURIE: 150 INJECTION, SOLUTION INTRAVENOUS at 09:07

## 2025-08-12 ENCOUNTER — TELEPHONE (OUTPATIENT)
Dept: CARDIOLOGY | Facility: CLINIC | Age: 60
End: 2025-08-12
Payer: MEDICAID